# Patient Record
Sex: FEMALE | Race: WHITE
[De-identification: names, ages, dates, MRNs, and addresses within clinical notes are randomized per-mention and may not be internally consistent; named-entity substitution may affect disease eponyms.]

---

## 2018-09-30 ENCOUNTER — HOSPITAL ENCOUNTER (EMERGENCY)
Dept: HOSPITAL 47 - EC | Age: 33
Discharge: HOME | End: 2018-09-30
Payer: COMMERCIAL

## 2018-09-30 VITALS
HEART RATE: 85 BPM | SYSTOLIC BLOOD PRESSURE: 124 MMHG | TEMPERATURE: 98.2 F | RESPIRATION RATE: 16 BRPM | DIASTOLIC BLOOD PRESSURE: 87 MMHG

## 2018-09-30 DIAGNOSIS — S01.511A: Primary | ICD-10-CM
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
## 2018-09-30 DIAGNOSIS — Z79.3: ICD-10-CM

## 2018-09-30 PROCEDURE — 12011 RPR F/E/E/N/L/M 2.5 CM/<: CPT

## 2018-09-30 PROCEDURE — 99283 EMERGENCY DEPT VISIT LOW MDM: CPT

## 2018-09-30 NOTE — ED
Wound/Laceration HPI





- General


Chief Complaint: Wound/Laceration


Stated Complaint: Lip Lac


Time Seen by Provider: 09/30/18 13:58


Source: patient, RN notes reviewed, old records reviewed


Mode of arrival: ambulatory


Limitations: no limitations





- History of Present Illness


Initial Comments: 





This is a 33 year old female with CC of laceration over L lip corner. Patient 

had the laceration after ship tripped and fell. She reports she fell last 

night. Patient reports when she eats the laceration reopens. Patient is up to 

date on TDAP. 





- Related Data


 Home Medications











 Medication  Instructions  Recorded  Confirmed


 


Orthotricycline 1 tab PO DAILY 08/16/14 08/16/14











 Allergies











Allergy/AdvReac Type Severity Reaction Status Date / Time


 


No Known Allergies Allergy   Verified 09/30/18 13:48














Review of Systems


ROS Statement: 


Those systems with pertinent positive or pertinent negative responses have been 

documented in the HPI.





ROS Other: All systems not noted in ROS Statement are negative.





Past Medical History


Past Medical History: No Reported History


History of Any Multi-Drug Resistant Organisms: None Reported


Past Surgical History: Tonsillectomy


Additional Past Surgical History / Comment(s): wisdom teeth removal


Past Psychological History: No Psychological Hx Reported


Smoking Status: Never smoker


Past Alcohol Use History: Occasional


Past Drug Use History: None Reported





General Exam





- General Exam Comments


Initial Comments: 





Well appearing 33 year old female, no distress. 


Limitations: no limitations


General appearance: alert, in no apparent distress


Head exam: Present: atraumatic, normocephalic, normal inspection


Eye exam: Present: normal appearance, PERRL, EOMI.  Absent: scleral icterus, 

conjunctival injection, periorbital swelling


ENT exam: Present: normal exam, mucous membranes moist, other (2cm laceration 

over the corner of left lip. No vermilion border envolvement. )


Neck exam: Present: normal inspection.  Absent: tenderness, meningismus, 

lymphadenopathy


Respiratory exam: Present: normal lung sounds bilaterally.  Absent: respiratory 

distress, wheezes, rales, rhonchi, stridor


Cardiovascular Exam: Present: regular rate, normal rhythm, normal heart sounds.

  Absent: systolic murmur, diastolic murmur, rubs, gallop, clicks


GI/Abdominal exam: Present: soft, normal bowel sounds.  Absent: distended, 

tenderness, guarding, rebound, rigid


Extremities exam: Present: normal inspection, full ROM, normal capillary 

refill.  Absent: tenderness, pedal edema, joint swelling, calf tenderness


Back exam: Present: normal inspection


Neurological exam: Present: alert, oriented X3, CN II-XII intact


Psychiatric exam: Present: normal affect, normal mood


Skin exam: Present: warm, dry, intact, normal color.  Absent: rash





Course


 Vital Signs











  09/30/18





  13:45


 


Temperature 98.2 F


 


Pulse Rate 85


 


Respiratory 16





Rate 


 


Blood Pressure 124/87


 


O2 Sat by Pulse 100





Oximetry 














Procedures





- Laceration


  ** Laceration #1


Site: lip (L lip corner)


Size (cm): 2


Description: linear


Depth: simple, single layer


Anesthetic Used: lidocaine 1%


Anesthesia Technique: local infiltration


Amount (mls): 2


Pre-repair: wound explored, irrigated extensively


Type of Sutures: nylon


Size of Sutures: 6-0


Number of Sutures: 2


Technique: simple, interrupted


Patient Tolerated Procedure: well, no complications





Medical Decision Making





- Medical Decision Making





33 year old female with left lip laceration after falling last night. Patient 

has less than 2cm laceration not involving the vermillion border. Patient had 2 

cm laceration that was well approximated with 2 sutures. Wound was cleansed. 

TDAP is up todate.  Discussed monitoring for infection. Discussed return 

parameters. All questions answered and return parameters discussed. 





Disposition


Clinical Impression: 


 Facial laceration





Disposition: HOME SELF-CARE


Condition: Good


Instructions:  Facial Laceration (ED)


Additional Instructions: 


Please return to the emergency room in 5 days to have sutures removed. Please 

leave wound covered for the first 24-48 hours and then leave open to air after 

that time. Please use clean soap and water to clean the suture area to prevent 

scabbing over the top of your sutures. Please watch for any signs of infection 

which may include but not limited to increased pain, swelling, redness, fever 

or chills. Please return to the emergency room if any signs of infection do 

occur. Please return to the emergency room for any other concerns or 

complications. 


Is patient prescribed a controlled substance at d/c from ED?: No


Referrals: 


Reena St MD [Primary Care Provider] - 1-2 days


Time of Disposition: 14:37